# Patient Record
Sex: FEMALE | Employment: OTHER | URBAN - METROPOLITAN AREA
[De-identification: names, ages, dates, MRNs, and addresses within clinical notes are randomized per-mention and may not be internally consistent; named-entity substitution may affect disease eponyms.]

---

## 2017-05-18 ENCOUNTER — FOLLOW UP (OUTPATIENT)
Dept: URBAN - METROPOLITAN AREA CLINIC 11 | Facility: CLINIC | Age: 82
End: 2017-05-18

## 2017-05-18 NOTE — PATIENT DISCUSSION
Avastin recommended today after discussion of benefits, risks and alternatives. The injection was given and tolerated well by patient. Post-injection instructions were reviewed and understood by the patient.  I will plan on doing two more injections.

## 2017-05-18 NOTE — PROCEDURE NOTE: CLINICAL
PROCEDURE NOTE: Avastin () OD. Anesthesia: Topical. Prep: Betadine Drops and Betadine Scrub. Intravitreal injection of Avastin 1.25mg/0.05 ml was given. Injection site: 3-4 mm from the limbus. Patient tolerated procedure well. CF vision checked. There were no complications. Post-op instructions given. Lot #: [email or web address]. Expiration Date: 7/11/2017. Inventory Id: isi Renteria

## 2017-05-19 ASSESSMENT — TONOMETRY
OS_IOP_MMHG: 13
OD_IOP_MMHG: 15

## 2017-05-19 ASSESSMENT — VISUAL ACUITY
OD_CC: 20/30-2
OS_CC: CF 3FT

## 2017-09-05 ENCOUNTER — FOLLOW UP (OUTPATIENT)
Dept: URBAN - METROPOLITAN AREA CLINIC 11 | Facility: CLINIC | Age: 82
End: 2017-09-05

## 2017-09-05 ASSESSMENT — TONOMETRY: OD_IOP_MMHG: 9

## 2017-09-05 ASSESSMENT — VISUAL ACUITY
OS_CC: CF 2FT
OD_CC: 20/25+1

## 2017-10-10 ENCOUNTER — FOLLOW UP (OUTPATIENT)
Dept: URBAN - METROPOLITAN AREA CLINIC 11 | Facility: CLINIC | Age: 82
End: 2017-10-10

## 2017-10-10 ASSESSMENT — TONOMETRY: OD_IOP_MMHG: 11

## 2017-10-10 ASSESSMENT — VISUAL ACUITY
OD_CC: 20/20-1
OS_CC: CF 2FT

## 2017-12-12 ENCOUNTER — FOLLOW UP (OUTPATIENT)
Dept: URBAN - METROPOLITAN AREA CLINIC 11 | Facility: CLINIC | Age: 82
End: 2017-12-12

## 2017-12-12 DIAGNOSIS — H35.3211: ICD-10-CM

## 2017-12-12 DIAGNOSIS — H35.3222: ICD-10-CM

## 2017-12-12 PROCEDURE — 1036F TOBACCO NON-USER: CPT

## 2017-12-12 PROCEDURE — G9744 PT NOT ELI D/T ACT DIG HTN: HCPCS

## 2017-12-12 PROCEDURE — 92134 CPTRZ OPH DX IMG PST SGM RTA: CPT

## 2017-12-12 PROCEDURE — 4177F TALK PT/CRGVR RE AREDS PREV: CPT

## 2017-12-12 PROCEDURE — G8427 DOCREV CUR MEDS BY ELIG CLIN: HCPCS

## 2017-12-12 PROCEDURE — 2019F DILATED MACUL EXAM DONE: CPT

## 2017-12-12 PROCEDURE — G8756 NO BP MEASURE DOC: HCPCS

## 2017-12-12 ASSESSMENT — VISUAL ACUITY
OD_CC: 20/20-1
OS_CC: CF 2FT

## 2017-12-12 ASSESSMENT — TONOMETRY
OD_IOP_MMHG: 17
OS_IOP_MMHG: 11

## 2018-02-15 ENCOUNTER — FOLLOW UP (OUTPATIENT)
Dept: URBAN - METROPOLITAN AREA CLINIC 11 | Facility: CLINIC | Age: 83
End: 2018-02-15

## 2018-02-15 VITALS — DIASTOLIC BLOOD PRESSURE: 70 MMHG | HEIGHT: 55 IN | SYSTOLIC BLOOD PRESSURE: 110 MMHG

## 2018-02-15 ASSESSMENT — VISUAL ACUITY
OS_CC: CF 2FT
OD_CC: 20/20-2

## 2018-02-15 ASSESSMENT — TONOMETRY
OS_IOP_MMHG: 12
OD_IOP_MMHG: 16

## 2018-02-15 NOTE — PATIENT DISCUSSION
Based on today’s exam, diagnostic studies, and/or review of records, the determination was made for treatment today.

## 2018-02-15 NOTE — PROCEDURE NOTE: CLINICAL
PROCEDURE NOTE: Avastin () #15 OD. Diagnosis: Neovascular AMD with Active CNV. Intravitreal injection of Avastin 1.25mg/0.05 ml was given. Injection site: 3-4 mm from the limbus. Patient tolerated procedure well. CF vision checked. There were no complications. Post-op instructions given. Lot #: [id number]. Expiration Date: . EXP. Inventory Id: isi Marie

## 2018-05-14 ENCOUNTER — FOLLOW UP (OUTPATIENT)
Dept: URBAN - METROPOLITAN AREA CLINIC 18 | Facility: CLINIC | Age: 83
End: 2018-05-14

## 2018-05-14 NOTE — PATIENT DISCUSSION
There is no recurrence of intraretinal or subretinal fluid on spectral domain OCT today. Defer Avastin today.

## 2018-05-14 NOTE — PATIENT DISCUSSION
Treatment will be re-instituted if optical coherence tomography shows recurrence of leakage or growth of the subretinal neovascular membrane.

## 2018-05-16 ASSESSMENT — TONOMETRY
OD_IOP_MMHG: 16
OS_IOP_MMHG: 14

## 2018-05-16 ASSESSMENT — VISUAL ACUITY
OS_CC: CF 2FT
OD_CC: 20/30+1

## 2018-07-19 ENCOUNTER — FOLLOW UP (OUTPATIENT)
Dept: URBAN - METROPOLITAN AREA CLINIC 11 | Facility: CLINIC | Age: 83
End: 2018-07-19

## 2018-07-19 ASSESSMENT — VISUAL ACUITY
OS_CC: CF 2FT
OD_CC: 20/25

## 2018-07-19 ASSESSMENT — TONOMETRY
OS_IOP_MMHG: 17
OD_IOP_MMHG: 16

## 2018-07-19 NOTE — PROCEDURE NOTE: CLINICAL
PROCEDURE NOTE: Avastin () #17 OD. Diagnosis: Neovascular AMD with Active CNV. Anesthesia: Topical/Subconjunctival. Prep: Betadine Drops and Betadine Scrub. Intravitreal injection of Avastin 1.25mg/0.05 ml was given. Injection site: 3-4 mm from the limbus. Patient tolerated procedure well. CF vision checked. There were no complications. Post-op instructions given. Lot #: [email or web address]. Expiration Date: Sun Sep 02 2018 00:00:00 [facility] Daylight Time. Inventory Id: isi Bhandari

## 2018-09-25 ENCOUNTER — FOLLOW UP (OUTPATIENT)
Dept: URBAN - METROPOLITAN AREA CLINIC 11 | Facility: CLINIC | Age: 83
End: 2018-09-25

## 2018-09-25 ASSESSMENT — VISUAL ACUITY
OD_CC: 20/30-1
OS_CC: CF 3FT

## 2018-09-25 ASSESSMENT — TONOMETRY: OD_IOP_MMHG: 09

## 2018-09-25 NOTE — PROCEDURE NOTE: CLINICAL
PROCEDURE NOTE: Avastin () #19 OD. Diagnosis: Neovascular AMD with Active CNV. Intravitreal injection of Avastin 1.25mg/0.05 ml was given. Injection site: 3-4 mm from the limbus. Patient tolerated procedure well. CF vision checked. There were no complications. Post-op instructions given. Lot #: [email or web address]. Expiration Date: 2018-10-24T04:00:00. Inventory Id: isi Britt

## 2018-11-29 ENCOUNTER — FOLLOW UP (OUTPATIENT)
Dept: URBAN - METROPOLITAN AREA CLINIC 11 | Facility: CLINIC | Age: 83
End: 2018-11-29

## 2018-11-29 ASSESSMENT — VISUAL ACUITY
OS_SC: CF 3FT
OD_SC: 20/30-2

## 2018-11-29 ASSESSMENT — TONOMETRY
OS_IOP_MMHG: 16
OD_IOP_MMHG: 15

## 2019-01-10 ENCOUNTER — FOLLOW UP (OUTPATIENT)
Dept: URBAN - METROPOLITAN AREA CLINIC 11 | Facility: CLINIC | Age: 84
End: 2019-01-10

## 2019-01-10 ASSESSMENT — VISUAL ACUITY
OD_CC: 20/20-2
OS_CC: CF 3FT

## 2019-01-10 ASSESSMENT — TONOMETRY: OD_IOP_MMHG: 16

## 2019-02-21 ENCOUNTER — FOLLOW UP (OUTPATIENT)
Dept: URBAN - METROPOLITAN AREA CLINIC 11 | Facility: CLINIC | Age: 84
End: 2019-02-21

## 2019-02-21 ASSESSMENT — TONOMETRY: OD_IOP_MMHG: 10

## 2019-02-21 ASSESSMENT — VISUAL ACUITY
OD_CC: 20/25-1
OS_CC: CF 1FT

## 2019-02-21 NOTE — PATIENT DISCUSSION
No treatment needed today. I have asked that she return in 2 months or sooner if she has any problems or concerns.

## 2019-04-25 ENCOUNTER — FOLLOW UP (OUTPATIENT)
Dept: URBAN - METROPOLITAN AREA CLINIC 11 | Facility: CLINIC | Age: 84
End: 2019-04-25

## 2019-04-25 ASSESSMENT — VISUAL ACUITY
OD_CC: 20/25-2
OS_CC: CF 2FT

## 2019-04-25 ASSESSMENT — TONOMETRY: OD_IOP_MMHG: 14

## 2019-04-25 NOTE — PATIENT DISCUSSION
Treatment will be re-instituted if OCT and exam show recurrence of leakage or growth of the subretinal neovascular membrane.

## 2019-07-23 ENCOUNTER — FOLLOW UP (OUTPATIENT)
Dept: URBAN - METROPOLITAN AREA CLINIC 11 | Facility: CLINIC | Age: 84
End: 2019-07-23

## 2019-07-23 ASSESSMENT — VISUAL ACUITY
OD_CC: 20/25-2
OS_CC: CF 3FT

## 2019-07-23 ASSESSMENT — TONOMETRY
OD_IOP_MMHG: 10
OS_IOP_MMHG: 14

## 2019-07-23 NOTE — PROCEDURE NOTE: CLINICAL
PROCEDURE NOTE: Avastin () #21 OD. Diagnosis: Neovascular AMD with Active CNV. Anesthesia: Topical/Subconjunctival. Prep: Betadine Drops and Betadine Scrub. Intravitreal injection of Avastin 1.25mg/0.05 ml was given. Injection site: 3-4 mm from the limbus. Patient tolerated procedure well. CF vision checked. There were no complications. Post-op instructions given. Lot #: [id number]. Expiration Date: Sun Sep 01 2019 00:00:00 GMT-0400 Cherylann Mole Daylight Time. Inventory Id: isi Parsons

## 2019-10-22 ENCOUNTER — FOLLOW UP (OUTPATIENT)
Dept: URBAN - METROPOLITAN AREA CLINIC 11 | Facility: CLINIC | Age: 84
End: 2019-10-22

## 2019-10-22 ASSESSMENT — TONOMETRY
OS_IOP_MMHG: 18
OD_IOP_MMHG: 15

## 2019-10-22 ASSESSMENT — VISUAL ACUITY
OD_CC: 20/25-1
OS_CC: CF 2FT

## 2019-12-19 ENCOUNTER — FOLLOW UP (OUTPATIENT)
Dept: URBAN - METROPOLITAN AREA CLINIC 11 | Facility: CLINIC | Age: 84
End: 2019-12-19

## 2019-12-19 ASSESSMENT — VISUAL ACUITY
OD_CC: 20/25-1
OS_CC: CF 2FT

## 2019-12-19 ASSESSMENT — TONOMETRY
OD_IOP_MMHG: 16
OS_IOP_MMHG: 10

## 2019-12-19 NOTE — PATIENT DISCUSSION
Treatment will be re-instituted if optical coherence tomography shows an increase in leakage in the right eye. I will re evaluate her again in 2 months.

## 2020-02-20 ENCOUNTER — FOLLOW UP (OUTPATIENT)
Dept: URBAN - METROPOLITAN AREA CLINIC 11 | Facility: CLINIC | Age: 85
End: 2020-02-20

## 2020-02-20 ASSESSMENT — VISUAL ACUITY
OD_CC: 20/25-2
OS_CC: CF 2FT

## 2020-02-20 ASSESSMENT — TONOMETRY
OD_IOP_MMHG: 09
OS_IOP_MMHG: 09

## 2020-02-20 NOTE — PROCEDURE NOTE: CLINICAL
PROCEDURE NOTE: Avastin () #23 OD. Diagnosis: Neovascular AMD with Active CNV. Anesthesia: Topical/Subconjunctival. Prep: Betadine Drops and Betadine Scrub. Intravitreal injection of Avastin 1.25mg/0.05 ml was given. Injection site: 3-4 mm from the limbus. Patient tolerated procedure well. CF vision checked. There were no complications. Post-op instructions given. Lot #: [id number]. Expiration Date: 4/7/2020. Inventory Id: [facility]. Henny Joshi

## 2020-05-21 ENCOUNTER — FOLLOW UP (OUTPATIENT)
Dept: URBAN - METROPOLITAN AREA CLINIC 11 | Facility: CLINIC | Age: 85
End: 2020-05-21

## 2020-05-21 ASSESSMENT — TONOMETRY
OD_IOP_MMHG: 09
OS_IOP_MMHG: 07

## 2020-05-21 ASSESSMENT — VISUAL ACUITY
OS_SC: CF 1FT
OD_SC: 20/25

## 2020-05-21 NOTE — PROCEDURE NOTE: CLINICAL
PROCEDURE NOTE: Avastin () #25 OD. Diagnosis: Neovascular AMD with Active CNV. Anesthesia: Topical/Subconjunctival. Prep: Betadine Drops and Betadine Scrub. Intravitreal injection of Avastin 1.25mg/0.05 ml was given. Injection site: 3-4 mm from the limbus. Patient tolerated procedure well. CF vision checked. There were no complications. Post-op instructions given. Lot #: [id number]. Expiration Date: 7/26/2020. Inventory Id: isi Olmedo

## 2020-10-05 ENCOUNTER — FOLLOW UP (OUTPATIENT)
Dept: URBAN - METROPOLITAN AREA CLINIC 11 | Facility: CLINIC | Age: 85
End: 2020-10-05

## 2020-10-05 ASSESSMENT — VISUAL ACUITY
OD_CC: 20/25
OS_CC: CF 1FT

## 2020-10-05 ASSESSMENT — TONOMETRY
OD_IOP_MMHG: 10
OS_IOP_MMHG: 14

## 2020-10-05 NOTE — PROCEDURE NOTE: CLINICAL
PROCEDURE NOTE: Avastin () #26 OD. Diagnosis: Neovascular AMD with Active CNV. Intravitreal injection of Avastin 1.25mg/0.05 ml was given. Injection site: 3-4 mm from the limbus. Patient tolerated procedure well. CF vision checked. There were no complications. Post-op instructions given. Lot #: [id number]. Expiration Date: [id number]:00:00. Inventory Id: isi Hsu

## 2020-12-22 ENCOUNTER — FOLLOW UP (OUTPATIENT)
Dept: URBAN - METROPOLITAN AREA CLINIC 11 | Facility: CLINIC | Age: 85
End: 2020-12-22

## 2020-12-22 ASSESSMENT — VISUAL ACUITY
OS_CC: CF 1FT
OD_CC: 20/25

## 2020-12-22 ASSESSMENT — TONOMETRY: OD_IOP_MMHG: 12

## 2021-05-07 ENCOUNTER — FOLLOW UP (OUTPATIENT)
Dept: URBAN - METROPOLITAN AREA CLINIC 11 | Facility: CLINIC | Age: 86
End: 2021-05-07

## 2021-05-07 DIAGNOSIS — H35.3211: ICD-10-CM

## 2021-05-07 PROCEDURE — 99213 OFFICE O/P EST LOW 20 MIN: CPT

## 2021-05-07 PROCEDURE — 92134 CPTRZ OPH DX IMG PST SGM RTA: CPT

## 2021-05-07 ASSESSMENT — TONOMETRY: OD_IOP_MMHG: 16

## 2021-05-07 ASSESSMENT — VISUAL ACUITY
OS_CC: CF 2FT
OD_CC: 20/30-1

## 2021-07-06 ENCOUNTER — FOLLOW UP (OUTPATIENT)
Dept: URBAN - METROPOLITAN AREA CLINIC 11 | Facility: CLINIC | Age: 86
End: 2021-07-06

## 2021-07-06 DIAGNOSIS — H35.3211: ICD-10-CM

## 2021-07-06 PROCEDURE — 99213 OFFICE O/P EST LOW 20 MIN: CPT

## 2021-07-06 PROCEDURE — 92134 CPTRZ OPH DX IMG PST SGM RTA: CPT

## 2021-07-06 ASSESSMENT — VISUAL ACUITY
OD_CC: 20/25-1
OS_CC: CF 2FT

## 2021-07-06 ASSESSMENT — TONOMETRY
OS_IOP_MMHG: 13
OD_IOP_MMHG: 12

## 2021-07-06 NOTE — PATIENT DISCUSSION
25 minutes spent in discussion with patient and her daughter. Her condition remains stable and I have deferred any treatment. I will see her in 2 months.

## 2021-09-14 ENCOUNTER — FOLLOW UP (OUTPATIENT)
Dept: URBAN - METROPOLITAN AREA CLINIC 11 | Facility: CLINIC | Age: 86
End: 2021-09-14

## 2021-09-14 DIAGNOSIS — H43.813: ICD-10-CM

## 2021-09-14 DIAGNOSIS — H35.3222: ICD-10-CM

## 2021-09-14 DIAGNOSIS — H35.3211: ICD-10-CM

## 2021-09-14 PROCEDURE — 92134 CPTRZ OPH DX IMG PST SGM RTA: CPT

## 2021-09-14 PROCEDURE — 99214 OFFICE O/P EST MOD 30 MIN: CPT

## 2021-09-14 ASSESSMENT — VISUAL ACUITY
OD_CC: 20/30+2
OS_CC: CF 1FT

## 2021-09-14 ASSESSMENT — TONOMETRY
OS_IOP_MMHG: 14
OD_IOP_MMHG: 14

## 2021-09-14 NOTE — PATIENT DISCUSSION
35 minutes spent in discussion with patient and her daughter. Her condition remains stable and I have deferred any treatment. I will see her in 2 months.

## 2021-12-31 ENCOUNTER — FOLLOW UP (OUTPATIENT)
Dept: URBAN - METROPOLITAN AREA CLINIC 11 | Facility: CLINIC | Age: 86
End: 2021-12-31

## 2021-12-31 DIAGNOSIS — H35.3222: ICD-10-CM

## 2021-12-31 DIAGNOSIS — H43.813: ICD-10-CM

## 2021-12-31 DIAGNOSIS — H35.3211: ICD-10-CM

## 2021-12-31 PROCEDURE — 99214 OFFICE O/P EST MOD 30 MIN: CPT

## 2021-12-31 ASSESSMENT — VISUAL ACUITY
OD_CC: 20/30-2
OS_CC: CF 2FT

## 2021-12-31 ASSESSMENT — TONOMETRY
OS_IOP_MMHG: 15
OD_IOP_MMHG: 15

## 2022-06-30 ENCOUNTER — COMPREHENSIVE EXAM (OUTPATIENT)
Dept: URBAN - METROPOLITAN AREA CLINIC 11 | Facility: CLINIC | Age: 87
End: 2022-06-30

## 2022-06-30 DIAGNOSIS — H35.3211: ICD-10-CM

## 2022-06-30 DIAGNOSIS — H35.3222: ICD-10-CM

## 2022-06-30 DIAGNOSIS — H43.813: ICD-10-CM

## 2022-06-30 PROCEDURE — 99214 OFFICE O/P EST MOD 30 MIN: CPT

## 2022-06-30 PROCEDURE — 92134 CPTRZ OPH DX IMG PST SGM RTA: CPT

## 2022-06-30 ASSESSMENT — VISUAL ACUITY: OD_SC: 20/30

## 2022-06-30 ASSESSMENT — TONOMETRY
OS_IOP_MMHG: 18
OD_IOP_MMHG: 16

## 2022-12-22 ENCOUNTER — COMPREHENSIVE EXAM (OUTPATIENT)
Dept: URBAN - METROPOLITAN AREA CLINIC 11 | Facility: CLINIC | Age: 87
End: 2022-12-22

## 2022-12-22 PROCEDURE — 92134 CPTRZ OPH DX IMG PST SGM RTA: CPT

## 2022-12-22 PROCEDURE — 99214 OFFICE O/P EST MOD 30 MIN: CPT

## 2022-12-22 ASSESSMENT — TONOMETRY
OD_IOP_MMHG: 8
OS_IOP_MMHG: 7

## 2022-12-22 ASSESSMENT — VISUAL ACUITY: OD_CC: 20/40+1

## 2023-04-11 ENCOUNTER — FOLLOW UP (OUTPATIENT)
Dept: URBAN - METROPOLITAN AREA CLINIC 11 | Facility: CLINIC | Age: 88
End: 2023-04-11

## 2023-04-11 DIAGNOSIS — H35.3211: ICD-10-CM

## 2023-04-11 DIAGNOSIS — Z96.1: ICD-10-CM

## 2023-04-11 DIAGNOSIS — H43.813: ICD-10-CM

## 2023-04-11 DIAGNOSIS — H35.3222: ICD-10-CM

## 2023-04-11 PROCEDURE — 92134 CPTRZ OPH DX IMG PST SGM RTA: CPT

## 2023-04-11 PROCEDURE — 99214 OFFICE O/P EST MOD 30 MIN: CPT

## 2023-04-11 PROCEDURE — 67028 INJECTION EYE DRUG: CPT

## 2023-04-11 ASSESSMENT — TONOMETRY
OD_IOP_MMHG: 18
OS_IOP_MMHG: 13

## 2023-04-11 ASSESSMENT — VISUAL ACUITY: OD_CC: 20/80-2

## 2023-05-16 ENCOUNTER — CLINIC PROCEDURE ONLY (OUTPATIENT)
Dept: URBAN - METROPOLITAN AREA CLINIC 11 | Facility: CLINIC | Age: 88
End: 2023-05-16

## 2023-05-16 DIAGNOSIS — H35.3211: ICD-10-CM

## 2023-05-16 PROCEDURE — 67028 INJECTION EYE DRUG: CPT

## 2023-05-16 ASSESSMENT — TONOMETRY
OD_IOP_MMHG: 22
OS_IOP_MMHG: 18

## 2023-05-16 ASSESSMENT — VISUAL ACUITY: OD_CC: 20/40-2

## 2023-06-15 ENCOUNTER — CLINIC PROCEDURE ONLY (OUTPATIENT)
Dept: URBAN - METROPOLITAN AREA CLINIC 11 | Facility: CLINIC | Age: 88
End: 2023-06-15

## 2023-06-15 DIAGNOSIS — H35.3211: ICD-10-CM

## 2023-06-15 PROCEDURE — 67028 INJECTION EYE DRUG: CPT

## 2023-06-15 ASSESSMENT — TONOMETRY
OS_IOP_MMHG: 9
OD_IOP_MMHG: 10

## 2023-06-15 ASSESSMENT — VISUAL ACUITY: OD_CC: 20/50

## 2023-07-27 ENCOUNTER — FOLLOW UP (OUTPATIENT)
Dept: URBAN - METROPOLITAN AREA CLINIC 11 | Facility: CLINIC | Age: 88
End: 2023-07-27

## 2023-07-27 DIAGNOSIS — H35.3222: ICD-10-CM

## 2023-07-27 DIAGNOSIS — H43.813: ICD-10-CM

## 2023-07-27 DIAGNOSIS — H35.3211: ICD-10-CM

## 2023-07-27 PROCEDURE — 99214 OFFICE O/P EST MOD 30 MIN: CPT

## 2023-07-27 PROCEDURE — 92134 CPTRZ OPH DX IMG PST SGM RTA: CPT

## 2023-07-27 ASSESSMENT — TONOMETRY
OD_IOP_MMHG: 13
OS_IOP_MMHG: 13

## 2023-07-27 ASSESSMENT — VISUAL ACUITY: OD_CC: 20/50-2

## 2023-09-07 ENCOUNTER — FOLLOW UP (OUTPATIENT)
Dept: URBAN - METROPOLITAN AREA CLINIC 11 | Facility: CLINIC | Age: 88
End: 2023-09-07

## 2023-09-07 DIAGNOSIS — H43.813: ICD-10-CM

## 2023-09-07 DIAGNOSIS — H35.3211: ICD-10-CM

## 2023-09-07 PROCEDURE — 67028 INJECTION EYE DRUG: CPT

## 2023-09-07 PROCEDURE — 92134 CPTRZ OPH DX IMG PST SGM RTA: CPT

## 2023-09-07 PROCEDURE — 99213 OFFICE O/P EST LOW 20 MIN: CPT

## 2023-09-07 ASSESSMENT — TONOMETRY
OS_IOP_MMHG: 8
OD_IOP_MMHG: 10

## 2023-09-07 ASSESSMENT — VISUAL ACUITY
OD_CC: 20/70-2
OD_PH: 20/70+2

## 2023-10-10 ENCOUNTER — CLINIC PROCEDURE ONLY (OUTPATIENT)
Dept: URBAN - METROPOLITAN AREA CLINIC 11 | Facility: CLINIC | Age: 88
End: 2023-10-10

## 2023-10-10 DIAGNOSIS — H35.3211: ICD-10-CM

## 2023-10-10 PROCEDURE — 67028 INJECTION EYE DRUG: CPT

## 2023-10-10 ASSESSMENT — TONOMETRY
OD_IOP_MMHG: 10
OS_IOP_MMHG: 14

## 2023-10-10 ASSESSMENT — VISUAL ACUITY: OD_CC: 20/60+2

## 2023-11-07 ENCOUNTER — CLINIC PROCEDURE ONLY (OUTPATIENT)
Dept: URBAN - METROPOLITAN AREA CLINIC 11 | Facility: CLINIC | Age: 88
End: 2023-11-07

## 2023-11-07 DIAGNOSIS — H35.3211: ICD-10-CM

## 2023-11-07 PROCEDURE — 67028 INJECTION EYE DRUG: CPT

## 2023-11-07 ASSESSMENT — TONOMETRY
OD_IOP_MMHG: 12
OS_IOP_MMHG: 12

## 2023-11-07 ASSESSMENT — VISUAL ACUITY: OD_CC: 20/70

## 2023-12-19 ENCOUNTER — FOLLOW UP (OUTPATIENT)
Dept: URBAN - METROPOLITAN AREA CLINIC 11 | Facility: CLINIC | Age: 88
End: 2023-12-19

## 2023-12-19 DIAGNOSIS — H35.3211: ICD-10-CM

## 2023-12-19 DIAGNOSIS — H35.3222: ICD-10-CM

## 2023-12-19 DIAGNOSIS — H43.813: ICD-10-CM

## 2023-12-19 PROCEDURE — 99213 OFFICE O/P EST LOW 20 MIN: CPT

## 2023-12-19 PROCEDURE — 92134 CPTRZ OPH DX IMG PST SGM RTA: CPT

## 2023-12-19 ASSESSMENT — VISUAL ACUITY: OD_CC: 20/60-3

## 2023-12-19 ASSESSMENT — TONOMETRY
OD_IOP_MMHG: 10
OS_IOP_MMHG: 10

## 2024-01-25 ENCOUNTER — ESTABLISHED PATIENT (OUTPATIENT)
Dept: URBAN - METROPOLITAN AREA CLINIC 11 | Facility: CLINIC | Age: 89
End: 2024-01-25

## 2024-01-25 DIAGNOSIS — H43.813: ICD-10-CM

## 2024-01-25 DIAGNOSIS — H35.3211: ICD-10-CM

## 2024-01-25 DIAGNOSIS — H35.3222: ICD-10-CM

## 2024-01-25 PROCEDURE — 67028 INJECTION EYE DRUG: CPT

## 2024-01-25 PROCEDURE — 99213 OFFICE O/P EST LOW 20 MIN: CPT

## 2024-01-25 ASSESSMENT — VISUAL ACUITY
OS_SC: CF 1FT
OD_SC: CF 2FT

## 2024-01-25 ASSESSMENT — TONOMETRY
OS_IOP_MMHG: 12
OD_IOP_MMHG: 11

## 2024-03-07 ENCOUNTER — FOLLOW UP (OUTPATIENT)
Dept: URBAN - METROPOLITAN AREA CLINIC 11 | Facility: CLINIC | Age: 89
End: 2024-03-07

## 2024-03-07 DIAGNOSIS — H35.3222: ICD-10-CM

## 2024-03-07 DIAGNOSIS — H35.3211: ICD-10-CM

## 2024-03-07 DIAGNOSIS — H43.813: ICD-10-CM

## 2024-03-07 DIAGNOSIS — Z96.1: ICD-10-CM

## 2024-03-07 PROCEDURE — 99213 OFFICE O/P EST LOW 20 MIN: CPT

## 2024-03-07 PROCEDURE — 92134 CPTRZ OPH DX IMG PST SGM RTA: CPT

## 2024-03-07 ASSESSMENT — VISUAL ACUITY
OS_CC: CF 1FT
OD_CC: 20/80

## 2024-03-07 ASSESSMENT — TONOMETRY
OS_IOP_MMHG: 10
OD_IOP_MMHG: 11

## 2024-04-18 ENCOUNTER — FOLLOW UP (OUTPATIENT)
Dept: URBAN - METROPOLITAN AREA CLINIC 11 | Facility: CLINIC | Age: 89
End: 2024-04-18

## 2024-04-18 DIAGNOSIS — H35.3211: ICD-10-CM

## 2024-04-18 DIAGNOSIS — H43.813: ICD-10-CM

## 2024-04-18 DIAGNOSIS — Z96.1: ICD-10-CM

## 2024-04-18 PROCEDURE — 92134 CPTRZ OPH DX IMG PST SGM RTA: CPT

## 2024-04-18 PROCEDURE — 67028 INJECTION EYE DRUG: CPT

## 2024-04-18 PROCEDURE — 99213 OFFICE O/P EST LOW 20 MIN: CPT | Mod: 25,RT

## 2024-04-18 ASSESSMENT — VISUAL ACUITY
OS_SC: CF 1FT
OD_SC: CF 2FT

## 2024-04-18 ASSESSMENT — TONOMETRY
OS_IOP_MMHG: 12
OD_IOP_MMHG: 11

## 2024-05-30 ENCOUNTER — FOLLOW UP (OUTPATIENT)
Dept: URBAN - METROPOLITAN AREA CLINIC 11 | Facility: CLINIC | Age: 89
End: 2024-05-30

## 2024-05-30 DIAGNOSIS — H43.813: ICD-10-CM

## 2024-05-30 DIAGNOSIS — H35.3211: ICD-10-CM

## 2024-05-30 DIAGNOSIS — Z96.1: ICD-10-CM

## 2024-05-30 DIAGNOSIS — H35.3222: ICD-10-CM

## 2024-05-30 PROCEDURE — 92134 CPTRZ OPH DX IMG PST SGM RTA: CPT

## 2024-05-30 PROCEDURE — 99213 OFFICE O/P EST LOW 20 MIN: CPT

## 2024-05-30 ASSESSMENT — VISUAL ACUITY
OD_CC: 20/50
OS_CC: CF 2FT

## 2024-05-30 ASSESSMENT — TONOMETRY
OD_IOP_MMHG: 9
OS_IOP_MMHG: 11

## 2024-07-11 ENCOUNTER — FOLLOW UP (OUTPATIENT)
Dept: URBAN - METROPOLITAN AREA CLINIC 11 | Facility: CLINIC | Age: 89
End: 2024-07-11

## 2024-07-11 DIAGNOSIS — Z96.1: ICD-10-CM

## 2024-07-11 DIAGNOSIS — H43.813: ICD-10-CM

## 2024-07-11 DIAGNOSIS — H35.3211: ICD-10-CM

## 2024-07-11 DIAGNOSIS — H35.3222: ICD-10-CM

## 2024-07-11 PROCEDURE — 99213 OFFICE O/P EST LOW 20 MIN: CPT | Mod: 25

## 2024-07-11 PROCEDURE — 67028 INJECTION EYE DRUG: CPT

## 2024-07-11 PROCEDURE — 92134 CPTRZ OPH DX IMG PST SGM RTA: CPT

## 2024-07-11 ASSESSMENT — TONOMETRY
OS_IOP_MMHG: 12
OD_IOP_MMHG: 13

## 2024-07-11 ASSESSMENT — VISUAL ACUITY
OD_CC: 20/60-1
OS_CC: CF 2FT

## 2024-09-16 ENCOUNTER — FOLLOW UP (OUTPATIENT)
Dept: URBAN - METROPOLITAN AREA CLINIC 11 | Facility: CLINIC | Age: 89
End: 2024-09-16

## 2024-09-16 DIAGNOSIS — H43.813: ICD-10-CM

## 2024-09-16 DIAGNOSIS — H35.3222: ICD-10-CM

## 2024-09-16 DIAGNOSIS — H35.3211: ICD-10-CM

## 2024-09-16 PROCEDURE — 92134 CPTRZ OPH DX IMG PST SGM RTA: CPT

## 2024-09-16 PROCEDURE — 99213 OFFICE O/P EST LOW 20 MIN: CPT

## 2024-11-18 ENCOUNTER — FOLLOW UP (OUTPATIENT)
Dept: URBAN - METROPOLITAN AREA CLINIC 11 | Facility: CLINIC | Age: 89
End: 2024-11-18

## 2024-11-18 DIAGNOSIS — H35.3222: ICD-10-CM

## 2024-11-18 DIAGNOSIS — H43.813: ICD-10-CM

## 2024-11-18 DIAGNOSIS — H35.3211: ICD-10-CM

## 2024-11-18 PROCEDURE — 92134 CPTRZ OPH DX IMG PST SGM RTA: CPT

## 2024-11-18 PROCEDURE — 99213 OFFICE O/P EST LOW 20 MIN: CPT | Mod: 25

## 2024-11-18 PROCEDURE — 67028 INJECTION EYE DRUG: CPT

## 2025-01-15 NOTE — PATIENT DISCUSSION
Addended by: JOSÉ MANUEL FERNANDES on: 1/15/2025 07:40 AM     Modules accepted: Orders     Avastin recommended today after discussion of benefits, risks and alternatives. The injection was given and tolerated well by patient. Post-injection instructions were reviewed and understood by the patient.

## 2025-01-20 ENCOUNTER — FOLLOW UP (OUTPATIENT)
Age: OVER 89
End: 2025-01-20

## 2025-01-20 DIAGNOSIS — H35.3222: ICD-10-CM

## 2025-01-20 DIAGNOSIS — H35.3211: ICD-10-CM

## 2025-01-20 DIAGNOSIS — H43.813: ICD-10-CM

## 2025-01-20 DIAGNOSIS — H35.3134: ICD-10-CM

## 2025-01-20 PROCEDURE — 92134 CPTRZ OPH DX IMG PST SGM RTA: CPT

## 2025-01-20 PROCEDURE — 99213 OFFICE O/P EST LOW 20 MIN: CPT

## 2025-03-03 ENCOUNTER — FOLLOW UP (OUTPATIENT)
Age: OVER 89
End: 2025-03-03

## 2025-03-03 DIAGNOSIS — H35.3222: ICD-10-CM

## 2025-03-03 DIAGNOSIS — H43.813: ICD-10-CM

## 2025-03-03 DIAGNOSIS — H35.3134: ICD-10-CM

## 2025-03-03 DIAGNOSIS — H35.3211: ICD-10-CM

## 2025-03-03 PROCEDURE — 99213 OFFICE O/P EST LOW 20 MIN: CPT | Mod: 25

## 2025-03-03 PROCEDURE — 92134 CPTRZ OPH DX IMG PST SGM RTA: CPT

## 2025-03-03 PROCEDURE — 67028 INJECTION EYE DRUG: CPT

## 2025-04-17 ENCOUNTER — FOLLOW UP (OUTPATIENT)
Age: OVER 89
End: 2025-04-17

## 2025-04-17 DIAGNOSIS — H43.813: ICD-10-CM

## 2025-04-17 DIAGNOSIS — H35.3134: ICD-10-CM

## 2025-04-17 DIAGNOSIS — H35.3222: ICD-10-CM

## 2025-04-17 DIAGNOSIS — H35.3211: ICD-10-CM

## 2025-04-17 PROCEDURE — 99213 OFFICE O/P EST LOW 20 MIN: CPT

## 2025-04-17 PROCEDURE — 92134 CPTRZ OPH DX IMG PST SGM RTA: CPT

## 2025-06-19 ENCOUNTER — FOLLOW UP (OUTPATIENT)
Age: OVER 89
End: 2025-06-19

## 2025-06-19 DIAGNOSIS — H43.813: ICD-10-CM

## 2025-06-19 DIAGNOSIS — H35.3211: ICD-10-CM

## 2025-06-19 DIAGNOSIS — H35.3222: ICD-10-CM

## 2025-06-19 DIAGNOSIS — H35.3134: ICD-10-CM

## 2025-06-19 PROCEDURE — 92134 CPTRZ OPH DX IMG PST SGM RTA: CPT

## 2025-06-19 PROCEDURE — 67028 INJECTION EYE DRUG: CPT

## 2025-06-19 PROCEDURE — 99214 OFFICE O/P EST MOD 30 MIN: CPT

## 2025-08-19 ENCOUNTER — FOLLOW UP (OUTPATIENT)
Age: OVER 89
End: 2025-08-19

## 2025-08-19 DIAGNOSIS — Z96.1: ICD-10-CM

## 2025-08-19 DIAGNOSIS — H35.3211: ICD-10-CM

## 2025-08-19 DIAGNOSIS — H35.3134: ICD-10-CM

## 2025-08-19 DIAGNOSIS — H43.813: ICD-10-CM

## 2025-08-19 DIAGNOSIS — H35.3222: ICD-10-CM

## 2025-08-19 PROCEDURE — 92134 CPTRZ OPH DX IMG PST SGM RTA: CPT

## 2025-08-19 PROCEDURE — 99213 OFFICE O/P EST LOW 20 MIN: CPT
